# Patient Record
Sex: MALE | Race: WHITE | NOT HISPANIC OR LATINO
[De-identification: names, ages, dates, MRNs, and addresses within clinical notes are randomized per-mention and may not be internally consistent; named-entity substitution may affect disease eponyms.]

---

## 2019-03-03 ENCOUNTER — TRANSCRIPTION ENCOUNTER (OUTPATIENT)
Age: 64
End: 2019-03-03

## 2019-03-03 ENCOUNTER — EMERGENCY (EMERGENCY)
Facility: HOSPITAL | Age: 64
LOS: 0 days | Discharge: HOME | End: 2019-03-03
Attending: EMERGENCY MEDICINE | Admitting: EMERGENCY MEDICINE

## 2019-03-03 VITALS
OXYGEN SATURATION: 97 % | HEART RATE: 66 BPM | DIASTOLIC BLOOD PRESSURE: 67 MMHG | RESPIRATION RATE: 18 BRPM | SYSTOLIC BLOOD PRESSURE: 120 MMHG

## 2019-03-03 VITALS
SYSTOLIC BLOOD PRESSURE: 137 MMHG | TEMPERATURE: 98 F | HEART RATE: 70 BPM | RESPIRATION RATE: 18 BRPM | OXYGEN SATURATION: 99 % | DIASTOLIC BLOOD PRESSURE: 81 MMHG

## 2019-03-03 DIAGNOSIS — F17.210 NICOTINE DEPENDENCE, CIGARETTES, UNCOMPLICATED: ICD-10-CM

## 2019-03-03 DIAGNOSIS — M54.9 DORSALGIA, UNSPECIFIED: ICD-10-CM

## 2019-03-03 DIAGNOSIS — M54.5 LOW BACK PAIN: ICD-10-CM

## 2019-03-03 LAB
APPEARANCE UR: CLEAR — SIGNIFICANT CHANGE UP
BACTERIA # UR AUTO: ABNORMAL
BILIRUB UR-MCNC: NEGATIVE — SIGNIFICANT CHANGE UP
COLOR SPEC: YELLOW — SIGNIFICANT CHANGE UP
DIFF PNL FLD: ABNORMAL
EPI CELLS # UR: ABNORMAL /HPF
GLUCOSE UR QL: NEGATIVE MG/DL — SIGNIFICANT CHANGE UP
KETONES UR-MCNC: NEGATIVE — SIGNIFICANT CHANGE UP
LEUKOCYTE ESTERASE UR-ACNC: NEGATIVE — SIGNIFICANT CHANGE UP
NITRITE UR-MCNC: NEGATIVE — SIGNIFICANT CHANGE UP
PH UR: 5.5 — SIGNIFICANT CHANGE UP (ref 5–8)
PROT UR-MCNC: 30 MG/DL
RBC CASTS # UR COMP ASSIST: ABNORMAL /HPF
SP GR SPEC: 1.02 — SIGNIFICANT CHANGE UP (ref 1.01–1.03)
UROBILINOGEN FLD QL: 0.2 MG/DL — SIGNIFICANT CHANGE UP (ref 0.2–0.2)
WBC UR QL: SIGNIFICANT CHANGE UP /HPF

## 2019-03-03 RX ORDER — METHOCARBAMOL 500 MG/1
750 TABLET, FILM COATED ORAL ONCE
Qty: 0 | Refills: 0 | Status: COMPLETED | OUTPATIENT
Start: 2019-03-03 | End: 2019-03-03

## 2019-03-03 RX ORDER — KETOROLAC TROMETHAMINE 30 MG/ML
30 SYRINGE (ML) INJECTION ONCE
Qty: 0 | Refills: 0 | Status: DISCONTINUED | OUTPATIENT
Start: 2019-03-03 | End: 2019-03-03

## 2019-03-03 RX ORDER — METHOCARBAMOL 500 MG/1
1 TABLET, FILM COATED ORAL
Qty: 15 | Refills: 0 | OUTPATIENT
Start: 2019-03-03 | End: 2019-03-07

## 2019-03-03 RX ADMIN — Medication 30 MILLIGRAM(S): at 13:50

## 2019-03-03 RX ADMIN — METHOCARBAMOL 750 MILLIGRAM(S): 500 TABLET, FILM COATED ORAL at 13:50

## 2019-03-03 NOTE — ED ADULT NURSE NOTE - NSIMPLEMENTINTERV_GEN_ALL_ED
Implemented All Universal Safety Interventions:  [place] to call system. Call bell, personal items and telephone within reach. Instruct patient to call for assistance. Room bathroom lighting operational. Non-slip footwear when patient is off stretcher. Physically safe environment: no spills, clutter or unnecessary equipment. Stretcher in lowest position, wheels locked, appropriate side rails in place.

## 2019-03-03 NOTE — ED PROVIDER NOTE - CARE PROVIDER_API CALL
Segun Gustafson)  Urology  [address] [place] [address], [address]  [place] [address]  Phone: [phone]  Fax: [phone]  Follow Up Time:

## 2019-03-03 NOTE — ED PROVIDER NOTE - ATTENDING CONTRIBUTION TO CARE
64 yo M no significant PMHx presents with c/o lower back pain x 3 days, no h/o trauma, pain worse with certain movements,  Pt seen at [facility] and sent for CT scan, no n/v/d, no urinary complaints, no incontinence of bowel or bladder, no rash.  on exam pt in NAD AAO x 3, ambulating with a cane, abd is soft nt nd, Lungs CTA B/L no wrr, no midline vertebral tenderness, no skin changes, + pain with ROM, good tone, equal strength

## 2019-03-03 NOTE — ED ADULT TRIAGE NOTE - CHIEF COMPLAINT QUOTE
c/o lower back pain starting 3 days ago, worsening today, sent in by urgent care, denies trauma to area

## 2019-03-03 NOTE — ED PROVIDER NOTE - PROGRESS NOTE DETAILS
spoke with patient regarding diagnostics. will dc home . advised patient of renal cysts and bladder diverticulm will follow up with urology

## 2019-03-03 NOTE — ED PROVIDER NOTE - CLINICAL SUMMARY MEDICAL DECISION MAKING FREE TEXT BOX
Results reviewed and d/w patient including incidental findings of hematuria and bladder diverticulum.  Rec supportive care, Tylenol or Motrin prn, f/u PMD and rehab clinic, will retrun if any worsening symptoms or concerns.

## 2019-03-03 NOTE — ED PROVIDER NOTE - PHYSICAL EXAMINATION
Back: (+) left lower back pain on flexion Back: (+) left lower back pain on flexion to left lower lumbar paraspinal region. no sciatic notch tenderness, no SLR. no foot drop. no saddle anesthesia

## 2019-03-03 NOTE — ED PROVIDER NOTE - OBJECTIVE STATEMENT
64yo M presents with lower back pain x 3 days. Pt states he went to an C today and was sent to the ER for a CT of his lower back. Pt states that the pain is radiating down the left leg. Pt denies any injury or trauma, no numbness or tingling, no saddle anesthesia, no bladder or bowel incontinence. Pt has tried icy hot with no relief.

## 2019-09-08 ENCOUNTER — TRANSCRIPTION ENCOUNTER (OUTPATIENT)
Age: 64
End: 2019-09-08

## 2019-10-06 ENCOUNTER — OUTPATIENT (OUTPATIENT)
Dept: OUTPATIENT SERVICES | Facility: HOSPITAL | Age: 64
LOS: 1 days | Discharge: HOME | End: 2019-10-06
Payer: MEDICAID

## 2019-10-06 DIAGNOSIS — R05 COUGH: ICD-10-CM

## 2019-10-06 PROCEDURE — 71260 CT THORAX DX C+: CPT | Mod: 26

## 2020-02-05 ENCOUNTER — OUTPATIENT (OUTPATIENT)
Dept: OUTPATIENT SERVICES | Facility: HOSPITAL | Age: 65
LOS: 1 days | Discharge: HOME | End: 2020-02-05
Payer: MEDICAID

## 2020-02-05 DIAGNOSIS — R31.0 GROSS HEMATURIA: ICD-10-CM

## 2020-02-05 PROCEDURE — 76770 US EXAM ABDO BACK WALL COMP: CPT | Mod: 26

## 2021-05-20 NOTE — ED PROVIDER NOTE - CROS ED RESP ALL NEG
Daily Note     Today's date: 2021  Patient name: Meeta Anderson  : 1973  MRN: [id number]  Referring provider: Tonia Nguyen DO  Dx:   Encounter Diagnosis     ICD-10-CM    1  Radiculopathy, cervical region  M54 12    2  Neck pain  M54 2    3  Impingement syndrome of left shoulder  M75 42                   Subjective: States that she has more motion in the shoulder, less pain, but still stiffness behind the back and some pain occassionally  Objective: See treatment diary below    Assessment: Completed exercise with correct technique and without reports of pain  Demonstrated moderate fatigue after exercise  Pt would benefit from continued PT services to reduce pain and increase level of function  Plan: Continue per plan of care  Progress treatment as tolerated         Precautions: Chronic pain syndrome, depression      Manuals        Central glides C4/5 MM    MM 2x25 NV       Median glides MM    MM 2x10 GF 2x10       GHJ distraction MM MM  MM  GF       PROM      GF  Overhead w/open palm       Neuro Re-Ed             TB row             [place] row walkback 16# 2x12 17# 2x12 19# 2x12 19# 2x12 16# x20 16#  x20       XS pull down 7# 3x10 10# 3x7 10# 3x7 10# 3x8 7# 3x7 7#  3x10       Inverse KB walk 4# 3 laps 3# 2x3 laps 3# 2x3 laps 3# 2x3 laps  3# 3 laps 3# 3 laps       [place] Row      20# x20       [place] Shld Ext 14# 2x11     12# x20       KB [place] 20# 3 laps L 20# 3 laps L 20# 3 laps L 20# 3 laps L  15/5# x2 laps ea       Instruction on front squat             Ther Ex             Cervical snags ext/rot             Median glides             Wall slide flex             Wall ER stretch             Lateral raise 3# 2x6 3# 3x6 3# 3x6                                                 Ther Activity                                       Gait Training                                       Modalities negative...

## 2021-06-21 ENCOUNTER — APPOINTMENT (OUTPATIENT)
Dept: OTOLARYNGOLOGY | Facility: CLINIC | Age: 66
End: 2021-06-21
Payer: MEDICAID

## 2021-06-21 ENCOUNTER — NON-APPOINTMENT (OUTPATIENT)
Age: 66
End: 2021-06-21

## 2021-06-21 VITALS — BODY MASS INDEX: 28.25 KG/M2 | HEIGHT: 67 IN | WEIGHT: 180 LBS

## 2021-06-21 PROCEDURE — 99204 OFFICE O/P NEW MOD 45 MIN: CPT

## 2021-06-21 RX ORDER — OFLOXACIN OTIC 3 MG/ML
0.3 SOLUTION AURICULAR (OTIC)
Qty: 1 | Refills: 0 | Status: ACTIVE | COMMUNITY
Start: 2021-06-21 | End: 1900-01-01

## 2021-06-21 NOTE — ASSESSMENT
[FreeTextEntry1] : left ear canal debrided.\par \par culture taken. \par \par I discussed with the patient the pathophysiology and location on a drawing of an otitis externa, and explained the risk factors of progression into a malignant otitis externa. I recommended in addition to his antibiotic treatment, dry ears precautions, and avoiding any intra-ear instrumentation at home including qtips avoidance.\par

## 2021-06-21 NOTE — HISTORY OF PRESENT ILLNESS
[de-identified] : Patient presents today with c/o clogged left ear. Has been bothering him for about 1 week. He admits pain and tenderness on the first day. He denies any tinnitus. When swallowing he hears a clicking noise. \par \par History of left parotid mass. no complains

## 2021-06-21 NOTE — PHYSICAL EXAM
[Normal] : mucosa is normal [Midline] : trachea located in midline position [de-identified] : healed neck wound.  [de-identified] : left canal swelling and debris

## 2021-06-25 ENCOUNTER — APPOINTMENT (OUTPATIENT)
Dept: OTOLARYNGOLOGY | Facility: CLINIC | Age: 66
End: 2021-06-25
Payer: MEDICAID

## 2021-06-25 DIAGNOSIS — H60.312 DIFFUSE OTITIS EXTERNA, LEFT EAR: ICD-10-CM

## 2021-06-25 LAB — BACTERIA SPEC CULT: NORMAL

## 2021-06-25 PROCEDURE — 99213 OFFICE O/P EST LOW 20 MIN: CPT

## 2021-06-25 NOTE — REASON FOR VISIT
[Subsequent Evaluation] : a subsequent evaluation for [FreeTextEntry2] : acute diffuse otitis externa of left ear

## 2021-06-25 NOTE — ASSESSMENT
[FreeTextEntry1] : culture results reviewed and discussed.\par \par Resolved otitis externa.\par \par RTC as needed.

## 2021-06-25 NOTE — HISTORY OF PRESENT ILLNESS
[FreeTextEntry1] : 06/25/21 : Patient returns today following up on acute diffuse otitis externa of left ear .  He has been using ofloxacin drops.   Ear is doing well  [de-identified] : Patient presents today with c/o clogged left ear. Has been bothering him for about 1 week. He admits pain and tenderness on the first day. He denies any tinnitus. When swallowing he hears a clicking noise. \par \par History of left parotid mass. no complains

## 2021-06-26 ENCOUNTER — TRANSCRIPTION ENCOUNTER (OUTPATIENT)
Age: 66
End: 2021-06-26